# Patient Record
Sex: MALE | Race: WHITE | Employment: FULL TIME | URBAN - METROPOLITAN AREA
[De-identification: names, ages, dates, MRNs, and addresses within clinical notes are randomized per-mention and may not be internally consistent; named-entity substitution may affect disease eponyms.]

---

## 2017-03-12 ENCOUNTER — HOSPITAL ENCOUNTER (EMERGENCY)
Age: 22
Discharge: HOME | End: 2017-03-12
Attending: EMERGENCY MEDICINE

## 2017-03-12 VITALS
RESPIRATION RATE: 16 BRPM | HEART RATE: 100 BPM | TEMPERATURE: 97.1 F | OXYGEN SATURATION: 98 % | SYSTOLIC BLOOD PRESSURE: 142 MMHG | WEIGHT: 148 LBS | DIASTOLIC BLOOD PRESSURE: 90 MMHG

## 2017-03-12 DIAGNOSIS — L02.91 ABSCESS: Primary | ICD-10-CM

## 2017-03-12 RX ORDER — HYDROCODONE BITARTRATE AND ACETAMINOPHEN 5; 325 MG/1; MG/1
1 TABLET ORAL
Qty: 10 TAB | Refills: 0 | Status: SHIPPED | OUTPATIENT
Start: 2017-03-12

## 2017-03-12 RX ORDER — DOXYCYCLINE 100 MG/1
100 CAPSULE ORAL 2 TIMES DAILY
COMMUNITY

## 2017-03-12 NOTE — UC PROVIDER NOTE
HPI Comments: Swelling, pain, left buttocks, history of hidradenitis, previous abscesses requiring incision and drainage    Patient is a 23 y.o. male presenting with skin problem. Skin Problem    This is a new problem. The current episode started more than 1 week ago. The problem has been gradually worsening. The problem is associated with an unknown factor. There has been no fever. The rash is present on the left buttock. The pain is at a severity of 8/10. The pain is moderate. The pain has been constant since onset. Associated symptoms include pain. Pertinent negatives include no blisters, no itching, no weeping and no hives. He has tried antibiotic for the symptoms. The treatment provided no relief. Past Medical History:   Diagnosis Date    Hidradenitis         History reviewed. No pertinent surgical history. History reviewed. No pertinent family history. Social History     Social History    Marital status: SINGLE     Spouse name: N/A    Number of children: N/A    Years of education: N/A     Occupational History    Not on file. Social History Main Topics    Smoking status: Never Smoker    Smokeless tobacco: Not on file    Alcohol use Not on file    Drug use: Not on file    Sexual activity: Not on file     Other Topics Concern    Not on file     Social History Narrative    No narrative on file                ALLERGIES: Review of patient's allergies indicates no known allergies. Review of Systems   Genitourinary: Negative. Skin: Positive for color change and wound. Negative for itching, pallor and rash. All other systems reviewed and are negative. Vitals:    03/12/17 1145   BP: 142/90   Pulse: 100   Resp: 16   Temp: 97.1 °F (36.2 °C)   SpO2: 98%   Weight: 67.1 kg (148 lb)       Physical Exam   Constitutional: He is oriented to person, place, and time. He appears well-developed and well-nourished. No distress. HENT:   Head: Normocephalic and atraumatic. Genitourinary:   Genitourinary Comments: Left buttocks  With approx 6 x 4 cm erythematous fluctuant mass, tenderness to palpation, central eschar, no drainage   Musculoskeletal: Normal range of motion. He exhibits no edema, tenderness or deformity. Neurological: He is alert and oriented to person, place, and time. No cranial nerve deficit. He exhibits normal muscle tone. Skin: He is not diaphoretic. Psychiatric: He has a normal mood and affect. His behavior is normal. Judgment and thought content normal.   Nursing note and vitals reviewed. MDM     Differential Diagnosis; Clinical Impression; Plan:     Left gluteal abscess, requiring incision and drainage      I&D Abcess Complex  Date/Time: 3/12/2017 12:24 PM  Performed by: Dariela Severino by: Goldy Amaya     Consent:     Consent obtained:  Verbal    Consent given by:  Patient    Risks discussed:  Incomplete drainage, pain and infection  Location:     Type:  Abscess    Location:  Anogenital    Anogenital location: left central gluteal region. Pre-procedure details:     Skin preparation:  Chloraprep  Anesthesia (see MAR for exact dosages): Anesthesia method:  Local infiltration    Local anesthetic:  Lidocaine 1% WITH epi (5 cc)  Procedure details:     Needle aspiration: no      Incision types:  Single straight    Incision depth:  Subcutaneous    Scalpel blade:  11    Wound management:  Probed and deloculated, irrigated with saline and extensive cleaning    Drainage:  Purulent    Drainage amount:  Copious    Wound treatment:  Drain placed    Packing materials:  1/2 in iodoform gauze and 1/4 in iodoform gauze  Post-procedure details:     Patient tolerance of procedure:   Tolerated well, no immediate complications

## 2017-03-12 NOTE — DISCHARGE INSTRUCTIONS

## 2017-03-14 ENCOUNTER — HOSPITAL ENCOUNTER (EMERGENCY)
Age: 22
Discharge: HOME | End: 2017-03-14
Attending: FAMILY MEDICINE

## 2017-03-14 VITALS
SYSTOLIC BLOOD PRESSURE: 148 MMHG | RESPIRATION RATE: 16 BRPM | TEMPERATURE: 97.9 F | OXYGEN SATURATION: 100 % | BODY MASS INDEX: 20.67 KG/M2 | DIASTOLIC BLOOD PRESSURE: 75 MMHG | HEIGHT: 73 IN | WEIGHT: 156 LBS | HEART RATE: 72 BPM

## 2017-03-14 DIAGNOSIS — Z51.89 WOUND CHECK, ABSCESS: Primary | ICD-10-CM

## 2017-03-14 RX ORDER — SULFAMETHOXAZOLE AND TRIMETHOPRIM 800; 160 MG/1; MG/1
1 TABLET ORAL 2 TIMES DAILY
Qty: 20 TAB | Refills: 0 | Status: SHIPPED | OUTPATIENT
Start: 2017-03-14 | End: 2017-03-24

## 2017-03-14 NOTE — UC PROVIDER NOTE
Patient is a 23 y.o. male presenting with wound check. The history is provided by the patient. Wound Check    This is a new problem. The problem has been gradually improving. Pain location: left buttock. The pain is at a severity of 3/10. He has tried heat (I and D, chronic doxycycline) for the symptoms. The treatment provided moderate relief. There has been no history of extremity trauma. Past Medical History:   Diagnosis Date    Hidradenitis         History reviewed. No pertinent surgical history. History reviewed. No pertinent family history. Social History     Social History    Marital status: SINGLE     Spouse name: N/A    Number of children: N/A    Years of education: N/A     Occupational History    Not on file. Social History Main Topics    Smoking status: Never Smoker    Smokeless tobacco: Not on file    Alcohol use Not on file    Drug use: Not on file    Sexual activity: Not on file     Other Topics Concern    Not on file     Social History Narrative                ALLERGIES: Review of patient's allergies indicates no known allergies. Review of Systems   Constitutional: Negative for chills. Skin: Positive for wound. Vitals:    03/14/17 1708   BP: 148/75   Pulse: 72   Resp: 16   Temp: 97.9 °F (36.6 °C)   SpO2: 100%   Weight: 70.8 kg (156 lb)   Height: 6' 1\" (1.854 m)       Physical Exam   Constitutional: He is oriented to person, place, and time. He appears well-developed and well-nourished. Pulmonary/Chest: Effort normal.   Neurological: He is alert and oriented to person, place, and time. Skin:   Wound on left buttock, packing in place   Psychiatric: He has a normal mood and affect. His behavior is normal. Judgment and thought content normal.   Nursing note and vitals reviewed. MDM     Differential Diagnosis; Clinical Impression; Plan:     CLINICAL IMPRESSION:  Wound check, abscess  (primary encounter diagnosis)    Plan:  1. Continue DOxycyline  2.  Add Bactrim DS BID for 10 days  3. Risk of Significant Complications, Morbidity, and/or Mortality:   Presenting problems: Moderate  Diagnostic procedures: Moderate  Management options:   Moderate  Progress:   Patient progress:  Stable      Procedures